# Patient Record
Sex: FEMALE | Race: WHITE | NOT HISPANIC OR LATINO | URBAN - METROPOLITAN AREA
[De-identification: names, ages, dates, MRNs, and addresses within clinical notes are randomized per-mention and may not be internally consistent; named-entity substitution may affect disease eponyms.]

---

## 2017-06-30 ENCOUNTER — EMERGENCY (EMERGENCY)
Facility: HOSPITAL | Age: 67
LOS: 1 days | Discharge: HOME | End: 2017-06-30
Attending: EMERGENCY MEDICINE
Payer: COMMERCIAL

## 2017-06-30 VITALS
HEIGHT: 68 IN | RESPIRATION RATE: 18 BRPM | SYSTOLIC BLOOD PRESSURE: 144 MMHG | OXYGEN SATURATION: 98 % | WEIGHT: 169.98 LBS | HEART RATE: 98 BPM | TEMPERATURE: 97 F | DIASTOLIC BLOOD PRESSURE: 76 MMHG

## 2017-06-30 DIAGNOSIS — R19.7 DIARRHEA, UNSPECIFIED: ICD-10-CM

## 2017-06-30 DIAGNOSIS — R52 PAIN, UNSPECIFIED: ICD-10-CM

## 2017-06-30 PROCEDURE — 99284 EMERGENCY DEPT VISIT MOD MDM: CPT

## 2017-07-01 LAB
ALBUMIN SERPL ELPH-MCNC: 3.9 G/DL — SIGNIFICANT CHANGE UP (ref 3.5–5)
ALP SERPL-CCNC: 106 U/L — SIGNIFICANT CHANGE UP (ref 40–120)
ALT FLD-CCNC: 70 U/L DA — HIGH (ref 10–60)
ANION GAP SERPL CALC-SCNC: 8 MMOL/L — SIGNIFICANT CHANGE UP (ref 5–17)
APTT BLD: 27.5 SEC — SIGNIFICANT CHANGE UP (ref 27.5–37.4)
AST SERPL-CCNC: 41 U/L — HIGH (ref 10–40)
BASOPHILS # BLD AUTO: 0.1 K/UL — SIGNIFICANT CHANGE UP (ref 0–0.2)
BASOPHILS NFR BLD AUTO: 1.6 % — SIGNIFICANT CHANGE UP (ref 0–2)
BILIRUB SERPL-MCNC: 0.8 MG/DL — SIGNIFICANT CHANGE UP (ref 0.2–1.2)
BUN SERPL-MCNC: 20 MG/DL — HIGH (ref 7–18)
CALCIUM SERPL-MCNC: 8.9 MG/DL — SIGNIFICANT CHANGE UP (ref 8.4–10.5)
CHLORIDE SERPL-SCNC: 107 MMOL/L — SIGNIFICANT CHANGE UP (ref 96–108)
CO2 SERPL-SCNC: 28 MMOL/L — SIGNIFICANT CHANGE UP (ref 22–31)
CREAT SERPL-MCNC: 0.82 MG/DL — SIGNIFICANT CHANGE UP (ref 0.5–1.3)
EOSINOPHIL # BLD AUTO: 0.1 K/UL — SIGNIFICANT CHANGE UP (ref 0–0.5)
EOSINOPHIL NFR BLD AUTO: 1.3 % — SIGNIFICANT CHANGE UP (ref 0–6)
GLUCOSE SERPL-MCNC: 130 MG/DL — HIGH (ref 70–99)
HCT VFR BLD CALC: 45.8 % — HIGH (ref 34.5–45)
HGB BLD-MCNC: 14.6 G/DL — SIGNIFICANT CHANGE UP (ref 11.5–15.5)
INR BLD: 0.93 RATIO — SIGNIFICANT CHANGE UP (ref 0.88–1.16)
LYMPHOCYTES # BLD AUTO: 2.3 K/UL — SIGNIFICANT CHANGE UP (ref 1–3.3)
LYMPHOCYTES # BLD AUTO: 42.6 % — SIGNIFICANT CHANGE UP (ref 13–44)
MCHC RBC-ENTMCNC: 29.6 PG — SIGNIFICANT CHANGE UP (ref 27–34)
MCHC RBC-ENTMCNC: 31.9 GM/DL — LOW (ref 32–36)
MCV RBC AUTO: 92.9 FL — SIGNIFICANT CHANGE UP (ref 80–100)
MONOCYTES # BLD AUTO: 0.5 K/UL — SIGNIFICANT CHANGE UP (ref 0–0.9)
MONOCYTES NFR BLD AUTO: 8.4 % — SIGNIFICANT CHANGE UP (ref 2–14)
NEUTROPHILS # BLD AUTO: 2.5 K/UL — SIGNIFICANT CHANGE UP (ref 1.8–7.4)
NEUTROPHILS NFR BLD AUTO: 46.2 % — SIGNIFICANT CHANGE UP (ref 43–77)
PCP SPEC-MCNC: SIGNIFICANT CHANGE UP
PLATELET # BLD AUTO: 267 K/UL — SIGNIFICANT CHANGE UP (ref 150–400)
POTASSIUM SERPL-MCNC: 4.2 MMOL/L — SIGNIFICANT CHANGE UP (ref 3.5–5.3)
POTASSIUM SERPL-SCNC: 4.2 MMOL/L — SIGNIFICANT CHANGE UP (ref 3.5–5.3)
PROT SERPL-MCNC: 7.8 G/DL — SIGNIFICANT CHANGE UP (ref 6–8.3)
PROTHROM AB SERPL-ACNC: 10.1 SEC — SIGNIFICANT CHANGE UP (ref 9.8–12.7)
RBC # BLD: 4.93 M/UL — SIGNIFICANT CHANGE UP (ref 3.8–5.2)
RBC # FLD: 11.8 % — SIGNIFICANT CHANGE UP (ref 10.3–14.5)
SODIUM SERPL-SCNC: 143 MMOL/L — SIGNIFICANT CHANGE UP (ref 135–145)
WBC # BLD: 5.4 K/UL — SIGNIFICANT CHANGE UP (ref 3.8–10.5)
WBC # FLD AUTO: 5.4 K/UL — SIGNIFICANT CHANGE UP (ref 3.8–10.5)

## 2017-07-01 PROCEDURE — 80053 COMPREHEN METABOLIC PANEL: CPT

## 2017-07-01 PROCEDURE — 36415 COLL VENOUS BLD VENIPUNCTURE: CPT

## 2017-07-01 PROCEDURE — 80307 DRUG TEST PRSMV CHEM ANLYZR: CPT

## 2017-07-01 PROCEDURE — 99284 EMERGENCY DEPT VISIT MOD MDM: CPT | Mod: 25

## 2017-07-01 PROCEDURE — 85027 COMPLETE CBC AUTOMATED: CPT

## 2017-07-01 PROCEDURE — 96374 THER/PROPH/DIAG INJ IV PUSH: CPT

## 2017-07-01 PROCEDURE — 93005 ELECTROCARDIOGRAM TRACING: CPT

## 2017-07-01 PROCEDURE — 85610 PROTHROMBIN TIME: CPT

## 2017-07-01 PROCEDURE — 85730 THROMBOPLASTIN TIME PARTIAL: CPT

## 2017-07-01 RX ORDER — CYCLOBENZAPRINE HYDROCHLORIDE 10 MG/1
10 TABLET, FILM COATED ORAL ONCE
Qty: 0 | Refills: 0 | Status: COMPLETED | OUTPATIENT
Start: 2017-07-01 | End: 2017-07-01

## 2017-07-01 RX ORDER — SODIUM CHLORIDE 9 MG/ML
1000 INJECTION INTRAMUSCULAR; INTRAVENOUS; SUBCUTANEOUS ONCE
Qty: 0 | Refills: 0 | Status: COMPLETED | OUTPATIENT
Start: 2017-07-01 | End: 2017-07-01

## 2017-07-01 RX ORDER — KETOROLAC TROMETHAMINE 30 MG/ML
30 SYRINGE (ML) INJECTION ONCE
Qty: 0 | Refills: 0 | Status: DISCONTINUED | OUTPATIENT
Start: 2017-07-01 | End: 2017-07-01

## 2017-07-01 RX ADMIN — Medication 30 MILLIGRAM(S): at 04:20

## 2017-07-01 RX ADMIN — SODIUM CHLORIDE 1000 MILLILITER(S): 9 INJECTION INTRAMUSCULAR; INTRAVENOUS; SUBCUTANEOUS at 02:20

## 2017-07-01 RX ADMIN — Medication 30 MILLIGRAM(S): at 02:32

## 2017-07-01 RX ADMIN — CYCLOBENZAPRINE HYDROCHLORIDE 10 MILLIGRAM(S): 10 TABLET, FILM COATED ORAL at 04:27

## 2017-07-01 NOTE — ED PROVIDER NOTE - OBJECTIVE STATEMENT
66 year old female came to the ED because she does not feel well. She has not traveled recently and denies any medical or psychiatric history. She feels that she ate a bad egg or bad orange juice and noted watery non-bloody diarrhea over the last 2 days. She also reports body aches. No fever, no vomiting, no abd pain, no chest pain, no sob.

## 2017-07-02 ENCOUNTER — EMERGENCY (EMERGENCY)
Facility: HOSPITAL | Age: 67
LOS: 1 days | Discharge: HOME | End: 2017-07-02
Attending: EMERGENCY MEDICINE
Payer: COMMERCIAL

## 2017-07-02 VITALS
OXYGEN SATURATION: 98 % | HEIGHT: 68 IN | TEMPERATURE: 98 F | DIASTOLIC BLOOD PRESSURE: 81 MMHG | HEART RATE: 91 BPM | RESPIRATION RATE: 18 BRPM | WEIGHT: 169.98 LBS | SYSTOLIC BLOOD PRESSURE: 111 MMHG

## 2017-07-02 VITALS
TEMPERATURE: 98 F | OXYGEN SATURATION: 100 % | RESPIRATION RATE: 16 BRPM | DIASTOLIC BLOOD PRESSURE: 75 MMHG | SYSTOLIC BLOOD PRESSURE: 144 MMHG

## 2017-07-02 VITALS
TEMPERATURE: 98 F | DIASTOLIC BLOOD PRESSURE: 78 MMHG | HEART RATE: 90 BPM | RESPIRATION RATE: 17 BRPM | SYSTOLIC BLOOD PRESSURE: 116 MMHG | OXYGEN SATURATION: 98 %

## 2017-07-02 VITALS
WEIGHT: 169.98 LBS | HEART RATE: 88 BPM | SYSTOLIC BLOOD PRESSURE: 151 MMHG | TEMPERATURE: 98 F | OXYGEN SATURATION: 100 % | RESPIRATION RATE: 16 BRPM | DIASTOLIC BLOOD PRESSURE: 76 MMHG | HEIGHT: 68 IN

## 2017-07-02 DIAGNOSIS — Z79.82 LONG TERM (CURRENT) USE OF ASPIRIN: ICD-10-CM

## 2017-07-02 DIAGNOSIS — K30 FUNCTIONAL DYSPEPSIA: ICD-10-CM

## 2017-07-02 DIAGNOSIS — R21 RASH AND OTHER NONSPECIFIC SKIN ERUPTION: ICD-10-CM

## 2017-07-02 DIAGNOSIS — R14.0 ABDOMINAL DISTENSION (GASEOUS): ICD-10-CM

## 2017-07-02 DIAGNOSIS — G43.909 MIGRAINE, UNSPECIFIED, NOT INTRACTABLE, WITHOUT STATUS MIGRAINOSUS: ICD-10-CM

## 2017-07-02 LAB
ANION GAP SERPL CALC-SCNC: 5 MMOL/L — SIGNIFICANT CHANGE UP (ref 5–17)
APTT BLD: 25.2 SEC — LOW (ref 27.5–37.4)
BASOPHILS # BLD AUTO: 0.1 K/UL — SIGNIFICANT CHANGE UP (ref 0–0.2)
BASOPHILS NFR BLD AUTO: 1.3 % — SIGNIFICANT CHANGE UP (ref 0–2)
BUN SERPL-MCNC: 22 MG/DL — HIGH (ref 7–18)
CALCIUM SERPL-MCNC: 9 MG/DL — SIGNIFICANT CHANGE UP (ref 8.4–10.5)
CHLORIDE SERPL-SCNC: 110 MMOL/L — HIGH (ref 96–108)
CO2 SERPL-SCNC: 29 MMOL/L — SIGNIFICANT CHANGE UP (ref 22–31)
CREAT SERPL-MCNC: 0.67 MG/DL — SIGNIFICANT CHANGE UP (ref 0.5–1.3)
EOSINOPHIL # BLD AUTO: 0.1 K/UL — SIGNIFICANT CHANGE UP (ref 0–0.5)
EOSINOPHIL NFR BLD AUTO: 1.8 % — SIGNIFICANT CHANGE UP (ref 0–6)
GLUCOSE SERPL-MCNC: 105 MG/DL — HIGH (ref 70–99)
HAV IGM SER-ACNC: SIGNIFICANT CHANGE UP
HBV CORE IGM SER-ACNC: SIGNIFICANT CHANGE UP
HBV SURFACE AG SER-ACNC: SIGNIFICANT CHANGE UP
HCT VFR BLD CALC: 41.4 % — SIGNIFICANT CHANGE UP (ref 34.5–45)
HCV AB S/CO SERPL IA: 0.12 S/CO — SIGNIFICANT CHANGE UP
HCV AB SERPL-IMP: SIGNIFICANT CHANGE UP
HGB BLD-MCNC: 13.6 G/DL — SIGNIFICANT CHANGE UP (ref 11.5–15.5)
INR BLD: 0.84 RATIO — LOW (ref 0.88–1.16)
LYMPHOCYTES # BLD AUTO: 2.4 K/UL — SIGNIFICANT CHANGE UP (ref 1–3.3)
LYMPHOCYTES # BLD AUTO: 34.8 % — SIGNIFICANT CHANGE UP (ref 13–44)
MCHC RBC-ENTMCNC: 30.9 PG — SIGNIFICANT CHANGE UP (ref 27–34)
MCHC RBC-ENTMCNC: 32.8 GM/DL — SIGNIFICANT CHANGE UP (ref 32–36)
MCV RBC AUTO: 94.2 FL — SIGNIFICANT CHANGE UP (ref 80–100)
MONOCYTES # BLD AUTO: 0.7 K/UL — SIGNIFICANT CHANGE UP (ref 0–0.9)
MONOCYTES NFR BLD AUTO: 10.2 % — SIGNIFICANT CHANGE UP (ref 2–14)
NEUTROPHILS # BLD AUTO: 3.5 K/UL — SIGNIFICANT CHANGE UP (ref 1.8–7.4)
NEUTROPHILS NFR BLD AUTO: 51.8 % — SIGNIFICANT CHANGE UP (ref 43–77)
PLATELET # BLD AUTO: 250 K/UL — SIGNIFICANT CHANGE UP (ref 150–400)
POTASSIUM SERPL-MCNC: 4.8 MMOL/L — SIGNIFICANT CHANGE UP (ref 3.5–5.3)
POTASSIUM SERPL-SCNC: 4.8 MMOL/L — SIGNIFICANT CHANGE UP (ref 3.5–5.3)
PROTHROM AB SERPL-ACNC: 9.1 SEC — LOW (ref 9.8–12.7)
RBC # BLD: 4.39 M/UL — SIGNIFICANT CHANGE UP (ref 3.8–5.2)
RBC # FLD: 12.2 % — SIGNIFICANT CHANGE UP (ref 10.3–14.5)
SODIUM SERPL-SCNC: 144 MMOL/L — SIGNIFICANT CHANGE UP (ref 135–145)
WBC # BLD: 6.8 K/UL — SIGNIFICANT CHANGE UP (ref 3.8–10.5)
WBC # FLD AUTO: 6.8 K/UL — SIGNIFICANT CHANGE UP (ref 3.8–10.5)

## 2017-07-02 PROCEDURE — 85027 COMPLETE CBC AUTOMATED: CPT

## 2017-07-02 PROCEDURE — 99284 EMERGENCY DEPT VISIT MOD MDM: CPT | Mod: 25

## 2017-07-02 PROCEDURE — 36415 COLL VENOUS BLD VENIPUNCTURE: CPT

## 2017-07-02 PROCEDURE — 96375 TX/PRO/DX INJ NEW DRUG ADDON: CPT

## 2017-07-02 PROCEDURE — 85730 THROMBOPLASTIN TIME PARTIAL: CPT

## 2017-07-02 PROCEDURE — 80074 ACUTE HEPATITIS PANEL: CPT

## 2017-07-02 PROCEDURE — 96372 THER/PROPH/DIAG INJ SC/IM: CPT

## 2017-07-02 PROCEDURE — 85610 PROTHROMBIN TIME: CPT

## 2017-07-02 PROCEDURE — 96374 THER/PROPH/DIAG INJ IV PUSH: CPT

## 2017-07-02 PROCEDURE — 80048 BASIC METABOLIC PNL TOTAL CA: CPT

## 2017-07-02 RX ORDER — AZTREONAM 2 G
1 VIAL (EA) INJECTION
Qty: 20 | Refills: 0 | OUTPATIENT
Start: 2017-07-02 | End: 2017-07-12

## 2017-07-02 RX ORDER — ASPIRIN/CALCIUM CARB/MAGNESIUM 324 MG
0 TABLET ORAL
Qty: 0 | Refills: 0 | COMMUNITY

## 2017-07-02 RX ORDER — KETOROLAC TROMETHAMINE 30 MG/ML
15 SYRINGE (ML) INJECTION ONCE
Qty: 0 | Refills: 0 | Status: DISCONTINUED | OUTPATIENT
Start: 2017-07-02 | End: 2017-07-02

## 2017-07-02 RX ORDER — ONDANSETRON 8 MG/1
4 TABLET, FILM COATED ORAL ONCE
Qty: 0 | Refills: 0 | Status: COMPLETED | OUTPATIENT
Start: 2017-07-02 | End: 2017-07-02

## 2017-07-02 RX ORDER — ONDANSETRON 8 MG/1
8 TABLET, FILM COATED ORAL ONCE
Qty: 0 | Refills: 0 | Status: COMPLETED | OUTPATIENT
Start: 2017-07-02 | End: 2017-07-02

## 2017-07-02 RX ORDER — KETOROLAC TROMETHAMINE 30 MG/ML
30 SYRINGE (ML) INJECTION ONCE
Qty: 0 | Refills: 0 | Status: DISCONTINUED | OUTPATIENT
Start: 2017-07-02 | End: 2017-07-02

## 2017-07-02 RX ORDER — CYCLOBENZAPRINE HYDROCHLORIDE 10 MG/1
1 TABLET, FILM COATED ORAL
Qty: 21 | Refills: 0 | OUTPATIENT
Start: 2017-07-02 | End: 2017-07-09

## 2017-07-02 RX ORDER — CYCLOBENZAPRINE HYDROCHLORIDE 10 MG/1
10 TABLET, FILM COATED ORAL ONCE
Qty: 0 | Refills: 0 | Status: COMPLETED | OUTPATIENT
Start: 2017-07-02 | End: 2017-07-02

## 2017-07-02 RX ORDER — LOPERAMIDE HCL 2 MG
2 TABLET ORAL ONCE
Qty: 0 | Refills: 0 | Status: COMPLETED | OUTPATIENT
Start: 2017-07-02 | End: 2017-07-02

## 2017-07-02 RX ORDER — SODIUM CHLORIDE 9 MG/ML
500 INJECTION INTRAMUSCULAR; INTRAVENOUS; SUBCUTANEOUS ONCE
Qty: 0 | Refills: 0 | Status: COMPLETED | OUTPATIENT
Start: 2017-07-02 | End: 2017-07-02

## 2017-07-02 RX ORDER — SIMETHICONE 80 MG/1
80 TABLET, CHEWABLE ORAL ONCE
Qty: 0 | Refills: 0 | Status: COMPLETED | OUTPATIENT
Start: 2017-07-02 | End: 2017-07-02

## 2017-07-02 RX ADMIN — ONDANSETRON 8 MILLIGRAM(S): 8 TABLET, FILM COATED ORAL at 21:40

## 2017-07-02 RX ADMIN — ONDANSETRON 4 MILLIGRAM(S): 8 TABLET, FILM COATED ORAL at 02:44

## 2017-07-02 RX ADMIN — Medication 15 MILLIGRAM(S): at 21:40

## 2017-07-02 RX ADMIN — Medication 30 MILLIGRAM(S): at 02:30

## 2017-07-02 RX ADMIN — Medication 1 TABLET(S): at 05:52

## 2017-07-02 RX ADMIN — SIMETHICONE 80 MILLIGRAM(S): 80 TABLET, CHEWABLE ORAL at 21:45

## 2017-07-02 RX ADMIN — Medication 30 MILLIGRAM(S): at 02:45

## 2017-07-02 RX ADMIN — SODIUM CHLORIDE 500 MILLILITER(S): 9 INJECTION INTRAMUSCULAR; INTRAVENOUS; SUBCUTANEOUS at 02:45

## 2017-07-02 RX ADMIN — CYCLOBENZAPRINE HYDROCHLORIDE 10 MILLIGRAM(S): 10 TABLET, FILM COATED ORAL at 05:57

## 2017-07-02 RX ADMIN — Medication 2 MILLIGRAM(S): at 02:44

## 2017-07-02 NOTE — ED ADULT NURSE NOTE - CHPI ED SYMPTOMS NEG
no decreased eating/drinking/no fever/no dizziness/no tingling/no nausea/no weakness/no numbness/no chills

## 2017-07-02 NOTE — ED PROVIDER NOTE - CHPI ED SYMPTOMS NEG
no  fever, no chills, no nausea, no vomiting, no diarrhea, no abd pain, no numbness, no tingling, no weakness

## 2017-07-02 NOTE — ED PROVIDER NOTE - MEDICAL DECISION MAKING DETAILS
Will have patient elevate legs, take abx and rest.  Follow up closely with pmd. Precautions reviewed.

## 2017-07-02 NOTE — ED ADULT NURSE NOTE - OBJECTIVE STATEMENT
Pt states that she brought a watermelon on thurs and she ate it on Thurs since then she is having bloated stomach. was here 3 days in a row and it is getting worst. Pt co bug bite on bilateral legs. Co nausea, no vomiting or diarrhea.

## 2017-07-02 NOTE — ED PROVIDER NOTE - OBJECTIVE STATEMENT
67 y/o F pt with no significant PMHx presents to ED c/o abd distention and rash to b/l lower extremities x today. Pt denies fever, chills, nausea, vomiting, diarrhea, abd pain, numbness, tingling, weakness, or any other complaints. NKDA.

## 2017-07-02 NOTE — ED PROVIDER NOTE - OBJECTIVE STATEMENT
65 y/o F with no significant PMHx presents to ED c/o abd distension today. Pt was seen in ED yesterday for abd distension and rash to b/l LE yesterday, which she had had for 2 weeks. Pt was asymptomatic at time of discharge and returns today with similar presentation. Pt denies any chest pain, fever, chills, nausea, vomiting, diarrhea, abd pain, numbness, tingling, weakness, or any other complaints. NKDA.

## 2017-07-03 ENCOUNTER — EMERGENCY (EMERGENCY)
Facility: HOSPITAL | Age: 67
LOS: 1 days | Discharge: HOME | End: 2017-07-03
Attending: EMERGENCY MEDICINE
Payer: COMMERCIAL

## 2017-07-03 VITALS
HEART RATE: 90 BPM | OXYGEN SATURATION: 98 % | SYSTOLIC BLOOD PRESSURE: 143 MMHG | WEIGHT: 169.98 LBS | HEIGHT: 68 IN | RESPIRATION RATE: 20 BRPM | DIASTOLIC BLOOD PRESSURE: 84 MMHG | TEMPERATURE: 99 F

## 2017-07-03 VITALS
DIASTOLIC BLOOD PRESSURE: 75 MMHG | OXYGEN SATURATION: 100 % | RESPIRATION RATE: 20 BRPM | SYSTOLIC BLOOD PRESSURE: 154 MMHG | HEART RATE: 87 BPM | TEMPERATURE: 98 F
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
## 2017-07-03 DIAGNOSIS — Z79.82 LONG TERM (CURRENT) USE OF ASPIRIN: ICD-10-CM

## 2017-07-03 DIAGNOSIS — F30.9 MANIC EPISODE, UNSPECIFIED: ICD-10-CM
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
## 2017-07-03 DIAGNOSIS — S80.212A ABRASION, LEFT KNEE, INITIAL ENCOUNTER: ICD-10-CM

## 2017-07-03 DIAGNOSIS — S60.512A ABRASION OF LEFT HAND, INITIAL ENCOUNTER: ICD-10-CM

## 2017-07-03 LAB
ANION GAP SERPL CALC-SCNC: 5 MMOL/L — SIGNIFICANT CHANGE UP (ref 5–17)
APPEARANCE UR: CLEAR — SIGNIFICANT CHANGE UP
BASOPHILS # BLD AUTO: 0.1 K/UL — SIGNIFICANT CHANGE UP (ref 0–0.2)
BASOPHILS NFR BLD AUTO: 0.9 % — SIGNIFICANT CHANGE UP (ref 0–2)
BILIRUB UR-MCNC: NEGATIVE — SIGNIFICANT CHANGE UP
BUN SERPL-MCNC: 21 MG/DL — HIGH (ref 7–18)
CALCIUM SERPL-MCNC: 8.5 MG/DL — SIGNIFICANT CHANGE UP (ref 8.4–10.5)
CHLORIDE SERPL-SCNC: 107 MMOL/L — SIGNIFICANT CHANGE UP (ref 96–108)
CO2 SERPL-SCNC: 28 MMOL/L — SIGNIFICANT CHANGE UP (ref 22–31)
COLOR SPEC: YELLOW — SIGNIFICANT CHANGE UP
CREAT SERPL-MCNC: 0.86 MG/DL — SIGNIFICANT CHANGE UP (ref 0.5–1.3)
DIFF PNL FLD: NEGATIVE — SIGNIFICANT CHANGE UP
EOSINOPHIL # BLD AUTO: 0.1 K/UL — SIGNIFICANT CHANGE UP (ref 0–0.5)
EOSINOPHIL NFR BLD AUTO: 1.8 % — SIGNIFICANT CHANGE UP (ref 0–6)
ETHANOL SERPL-MCNC: 4 MG/DL — SIGNIFICANT CHANGE UP (ref 0–10)
GLUCOSE SERPL-MCNC: 116 MG/DL — HIGH (ref 70–99)
GLUCOSE UR QL: NEGATIVE — SIGNIFICANT CHANGE UP
HCT VFR BLD CALC: 38.3 % — SIGNIFICANT CHANGE UP (ref 34.5–45)
HGB BLD-MCNC: 12.8 G/DL — SIGNIFICANT CHANGE UP (ref 11.5–15.5)
KETONES UR-MCNC: NEGATIVE — SIGNIFICANT CHANGE UP
LEUKOCYTE ESTERASE UR-ACNC: NEGATIVE — SIGNIFICANT CHANGE UP
LYMPHOCYTES # BLD AUTO: 1.9 K/UL — SIGNIFICANT CHANGE UP (ref 1–3.3)
LYMPHOCYTES # BLD AUTO: 29.6 % — SIGNIFICANT CHANGE UP (ref 13–44)
MCHC RBC-ENTMCNC: 30.9 PG — SIGNIFICANT CHANGE UP (ref 27–34)
MCHC RBC-ENTMCNC: 33.3 GM/DL — SIGNIFICANT CHANGE UP (ref 32–36)
MCV RBC AUTO: 92.8 FL — SIGNIFICANT CHANGE UP (ref 80–100)
MONOCYTES # BLD AUTO: 0.7 K/UL — SIGNIFICANT CHANGE UP (ref 0–0.9)
MONOCYTES NFR BLD AUTO: 11.2 % — SIGNIFICANT CHANGE UP (ref 2–14)
NEUTROPHILS # BLD AUTO: 3.6 K/UL — SIGNIFICANT CHANGE UP (ref 1.8–7.4)
NEUTROPHILS NFR BLD AUTO: 56.4 % — SIGNIFICANT CHANGE UP (ref 43–77)
NITRITE UR-MCNC: NEGATIVE — SIGNIFICANT CHANGE UP
PCP SPEC-MCNC: SIGNIFICANT CHANGE UP
PH UR: 6.5 — SIGNIFICANT CHANGE UP (ref 5–8)
PLATELET # BLD AUTO: 251 K/UL — SIGNIFICANT CHANGE UP (ref 150–400)
POTASSIUM SERPL-MCNC: 4.3 MMOL/L — SIGNIFICANT CHANGE UP (ref 3.5–5.3)
POTASSIUM SERPL-SCNC: 4.3 MMOL/L — SIGNIFICANT CHANGE UP (ref 3.5–5.3)
PROT UR-MCNC: NEGATIVE — SIGNIFICANT CHANGE UP
RBC # BLD: 4.13 M/UL — SIGNIFICANT CHANGE UP (ref 3.8–5.2)
RBC # FLD: 12.3 % — SIGNIFICANT CHANGE UP (ref 10.3–14.5)
SODIUM SERPL-SCNC: 140 MMOL/L — SIGNIFICANT CHANGE UP (ref 135–145)
SP GR SPEC: 1.01 — SIGNIFICANT CHANGE UP (ref 1.01–1.02)
T4 AB SER-ACNC: 8.6 UG/DL — SIGNIFICANT CHANGE UP (ref 4.6–12)
TSH SERPL-MCNC: 2.79 UU/ML — SIGNIFICANT CHANGE UP (ref 0.34–4.82)
UROBILINOGEN FLD QL: NEGATIVE — SIGNIFICANT CHANGE UP
WBC # BLD: 6.4 K/UL — SIGNIFICANT CHANGE UP (ref 3.8–10.5)
WBC # FLD AUTO: 6.4 K/UL — SIGNIFICANT CHANGE UP (ref 3.8–10.5)

## 2017-07-03 PROCEDURE — 81003 URINALYSIS AUTO W/O SCOPE: CPT

## 2017-07-03 PROCEDURE — 84443 ASSAY THYROID STIM HORMONE: CPT

## 2017-07-03 PROCEDURE — 80048 BASIC METABOLIC PNL TOTAL CA: CPT

## 2017-07-03 PROCEDURE — 99285 EMERGENCY DEPT VISIT HI MDM: CPT

## 2017-07-03 PROCEDURE — 85027 COMPLETE CBC AUTOMATED: CPT

## 2017-07-03 PROCEDURE — 36415 COLL VENOUS BLD VENIPUNCTURE: CPT

## 2017-07-03 PROCEDURE — 99284 EMERGENCY DEPT VISIT MOD MDM: CPT | Mod: 25

## 2017-07-03 PROCEDURE — 90792 PSYCH DIAG EVAL W/MED SRVCS: CPT

## 2017-07-03 PROCEDURE — 84436 ASSAY OF TOTAL THYROXINE: CPT

## 2017-07-03 PROCEDURE — 80307 DRUG TEST PRSMV CHEM ANLYZR: CPT

## 2017-07-03 RX ORDER — SODIUM CHLORIDE 9 MG/ML
1000 INJECTION INTRAMUSCULAR; INTRAVENOUS; SUBCUTANEOUS
Qty: 0 | Refills: 0 | Status: DISCONTINUED | OUTPATIENT
Start: 2017-07-03 | End: 2017-07-07

## 2017-07-03 RX ADMIN — SODIUM CHLORIDE 125 MILLILITER(S): 9 INJECTION INTRAMUSCULAR; INTRAVENOUS; SUBCUTANEOUS at 05:35

## 2017-07-03 NOTE — ED PROVIDER NOTE - PROGRESS NOTE DETAILS
This is the patients fourth visit to the ED in the last 3 days. The first two visits I cared for her, the third was by another physician. During previous visits she never admitted to a psychiatric history, but now admits to a history. She has not slept in 3 days. Denies suicidal or homicidal ideation. Psych consulted. Telepsych evaluated the patient, but they are arranging outpatient follow up, but will be discharged.

## 2017-07-03 NOTE — ED BEHAVIORAL HEALTH ASSESSMENT NOTE - DETAILS
aunt had depression insect bites on her legs discussed hold for the AM for collateral and arrange outpatient services if discharged. Dr. Frazier to discuss with oncoming tele BTA. came by self

## 2017-07-03 NOTE — ED BEHAVIORAL HEALTH ASSESSMENT NOTE - HPI (INCLUDE ILLNESS QUALITY, SEVERITY, DURATION, TIMING, CONTEXT, MODIFYING FACTORS, ASSOCIATED SIGNS AND SYMPTOMS)
67yo DWF, retired, living alone at the [facility] in [place], no reported medical hx, with a psychiatric hx significant for 5 prior psychiatric hospitalizations (last in 2011) for likely bipolar illness, however states that she has never been compliant with medications. Pt has been coming to the ER 4 times in the past 2 days with reports of vague pain, given injections of Toradol and released. ER felt that the pt was hyperverbal, odd, and preoccupied with thoughts of the board of the building being against her, however psychiatry had not been consulted because pt was not a danger to herself. Psychiatry was consulted today because the pt returned to the ER twice in one day and had some bizarre behavior (brought a bag with makeup samples, CD's and papers).    The pt presents hyperactive with some pressured speech. She is preoccupied with thoughts of board of her building being involved in some illegal activity. Pt also has some paranoid delusions that they are putting bugs in her bed and poisoning her food, and thus needs to come to the ER to have her bug bites examined (does have scabs on her legs) and get an IV to flush out the toxins. The pt states that they have no reason not to like her, she has no thoughts of retaliation, and states that she just plans on "keeping my mouth shut." Pt denies any AVH and does not appear internally preoccupied. Pt states that mood is "joyful" and states that she only requires 3 hours of sleep.   Denies any depressed mood, SI, SIB, SA. Pt denies h/o violence or any HI. She denies any h/o substance use.  pt states that she has not been f/u with psychiatry because her psychiatrist is an alcoholic, however she states that she would be interested in  services for case management and therapy.

## 2017-07-03 NOTE — ED BEHAVIORAL HEALTH ASSESSMENT NOTE - PSYCHIATRIC ISSUES AND PLAN (INCLUDE STANDING AND PRN MEDICATION)
pt currently not agitated. If pt becomes acutely agitated and physically aggressive can give Haldol 2mg IM

## 2017-07-03 NOTE — ED BEHAVIORAL HEALTH ASSESSMENT NOTE - DESCRIPTION
the pt has been in the ER multiple times. she remains hyperactive, but has been cooperative and non-aggressive. denied pt is , does not have many friends or family. she lives alone at the [facility]. 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

## 2017-07-03 NOTE — ED ADULT NURSE REASSESSMENT NOTE - NS ED NURSE REASSESS COMMENT FT1
Report received from HEYDI Gallo RN at 0700. Patient sitting on chair. Awake. Breathing on room air. Denies discomfort. Awaiting social work. 0800 patient states "I want to go to the cafeteria." MD Frazier made aware. IVHL removed.

## 2017-07-03 NOTE — ED BEHAVIORAL HEALTH NOTE - BEHAVIORAL HEALTH NOTE
[facility] attempted to contact patient’s emergency contact Arron Russo ([phone]) – No returned call or response. Also attempted to contact patient’s housing complex [phone] from 9am-9:45am- No answer.  Patient provided Professor Matthew Navarro [phone]  for collateral- Professor unavailable today. Unable to obtain any additional information.   Case was discussed with [place] Social Work team- Pt reporting that she has bed bugs in her apartment, discussed possible APS referral for home visit.  Disposition: Treat & Release   I have discussed the follow with Behavioral Telehealth Attending Dr. Perea

## 2017-07-03 NOTE — ED BEHAVIORAL HEALTH ASSESSMENT NOTE - SUMMARY
65yo DWF, retired, living alone at the [facility], no reported medical hx, with a psychiatric hx significant for 5 prior psychiatric hospitalizations (last in 2011) for likely bipolar illness, with poor treatment compliance. Pt has been presenting to the medical ER several times this week c/o vague pain and insect bites. psychiatry was consulted for bizarre behavior. The pt is presenting with decreased need for sleep, mood lability, pressured speech and has some paranoid delusions that the people in her housing board are trying to poison her. However the pt does not appear to be an acute danger to self (no SI, h/o SA, and appears to be caring for herself) or a danger to others (denies a h/o violence and denies thoughts of retaliation and access to a gun). At this time, collateral would be needed to determine if this is pt's baseline vs. escalation of symptoms. Collateral would be ideally from the building management to have a better understand of the pt's functioning and whether she has been threatening in any way. 65yo DWF, retired, living alone at the [facility], no reported medical hx, with a psychiatric hx significant for 5 prior psychiatric hospitalizations (last in 2011) for likely bipolar illness, with poor treatment compliance. Pt has been presenting to the medical ER several times this week c/o vague pain and insect bites. psychiatry was consulted for bizarre behavior. The pt is presenting with decreased need for sleep, mood lability, pressured speech and has some paranoid delusions that the people in her housing board are trying to poison her. However the pt does not appear to be an acute danger to self (no SI, h/o SA, and appears to be caring for herself) or a danger to others (denies a h/o violence and denies thoughts of retaliation and access to a gun). At this time, collateral would be helpful to determine if this is pt's baseline vs. escalation of symptoms. Collateral would be ideally from the building management to have a better understand of the pt's functioning and whether she has been threatening in any way. Pt also should have social work involvement to help obtain referrals in the community and reduce ER visits.

## 2017-07-03 NOTE — ED PROVIDER NOTE - MEDICAL DECISION MAKING DETAILS
Pt evaluated by psychiatry. Pt evaluated by psychiatry.  signed out to me pending psych clearance and  consult. performed. pt ready for dc home.

## 2017-07-03 NOTE — ED ADULT NURSE REASSESSMENT NOTE - NS ED NURSE REASSESS COMMENT FT1
Patient seen by tele psych with PCA present at 1005. Per Keira, "patient has to be seen by social work and then discharged."

## 2017-07-03 NOTE — ED BEHAVIORAL HEALTH NOTE - BEHAVIORAL HEALTH NOTE
TELEPSYCHIATRY ENCOUNTER  **********************************************	  I have visualized that the patient is on an arms-length 1:1  I have visualized that patient is in a private space  I have confirmed with the patient that they understand and agree to the evaluation being performed via Telepsychiatry  I have discussed the above with Telepsychiatry Attending Dr. Nilda Erwin    Patient is a 66 year old domiciled alone in an apartment retired   female non-caregiver with a history of 5 prior psychiatric hospitalizations most recent 2011 most likely Bipolar, no known suicidality, no known violence/arrests, no known substance abuse/withdrawal, no known PMH  brought in by self complaining of falling, presented to ED for 4th time in the past 2 days, patient presents to ED hyperverbal and odd behavior.    CM attempted outreach to emergency contact Arron Russo ([phone]) left voicemail requesting return call, outreach made to patient’s housing complex [phone], spoke to ewelina who states patient is a resident but unable to provide information, states to call back after 9am for management. Patient unable to provide any additional collateral contacts. Records in [place], [facility], [facility], [facility], [facility], [facility], [facility] reviewed as appropriate, no additional information available. Per staff patient calm and cooperative in ED no PRN medications needed.

## 2017-07-03 NOTE — ED PROVIDER NOTE - OBJECTIVE STATEMENT
66 y/o 67 y/o F with no significant PMHx presents to ED c/o fall, now with abrasion to L hand and L knee. Pt was seen in ED twice previously in the past 24 hours, for a worsening abd bloating. Pt also c/o rash on b/l legs, from 'bed bugs that her apartment's board place in her apartment because she was bugging them because she found out that they were dirty and laundering money.' Pt does not want to speak to a therapist because 'her last therapist was jealous of her because she did not have a liquor problem, and he did.' Pt admits having been to rehab 9 times. Pt also expresses that she has not been able to sleep well for 3 days. Denies any nausea, vomiting, diarrhea or any other complaints. NKDA. 67 y/o F with no significant PMHx presents to ED c/o fall, now with abrasion to L hand and L knee. Pt was seen in ED twice previously in the past 24 hours. Pt also c/o rash on b/l legs, from 'bed bugs that her apartment's board place in her apartment because she was bugging them because she found out that they were dirty and laundering money.' Pt does not want to speak to a therapist because 'her last therapist was jealous of her because she did not have a liquor problem, and he did.' Pt admits having been admitted to a psychiatric lujan 9 times. Pt also expresses that she has not been able to sleep well for 3 days. Denies any nausea, vomiting, diarrhea or any other complaints. NKDA.

## 2018-07-19 NOTE — ED PROVIDER NOTE - CARE PLAN
Your surgery is scheduled for 7/24/18.    Please report to Outpatient Surgery Intake Office on the 2nd FLOOR at 7:00 a.m.          INSTRUCTIONS IMPORTANT!!!  ¨ Do not eat or drink after 12 midnight-including water. OK to brush teeth, no   gum, candy or mints!    ¨ Take only these medicines with a small swallow of water-morning of surgery: Enalapril         ____  No powder, lotions or creams to surgical area.  ____  Please remove all jewelry, including piercings and leave at home.  ____  No money or valuables needed. Please leave at home.  ____  Please bring any documents given by your doctor.  ____  If going home the same day, arrange for a ride home. You will not be able to             drive if Anesthesia was used.  ____  Wear loose fitting clothing. Allow for dressings, bandages.  ____  Stop Aspirin, Ibuprofen, Motrin and Aleve at least 3-5 days before surgery, unless otherwise instructed by your doctor, or the nurse.   You MAY use Tylenol/acetaminophen until day of surgery.  ____  If you take diabetic medication, do not take am of surgery unless instructed by Doctor.  ____  Call MD for temperature above 101 degrees.  ____ Stop taking any Fish Oil supplement or any Vitamins that contain Vitamin E at least 5 days prior to surgery.  ____ Do Not wear your contact lenses the day of your procedure.  You may wear your glasses.        I have read or had read and explained to me, and understand the above information.  Additional comments or instructions:  For additional questions call [phone]        Cystoscopy    Cystoscopy is a procedure that lets your doctor look directly inside your urethra and bladder. It can be used to:  · Help diagnose a problem with your urethra, bladder, or kidneys.  · Take a sample (biopsy) of bladder or urethral tissue.  · Treat certain problems (such as removing kidney stones).  · Place a stent to bypass an obstruction.  · Take special X-rays of the kidneys.  Based on the findings, your doctor  may recommend other tests or treatments.  What is a cystoscope?  A cystoscope is a telescope-like instrument that contains lenses and fiberoptics (small glass wires that make bright light). The cystoscope may be straight and rigid, or flexible to bend around curves in the urethra. The doctor may look directly into the cystoscope, or project the image onto a monitor.  Getting ready  · Ask your doctor if you should stop taking any medicines before the procedure.  · Ask whether you should avoid eating or drinking anything after midnight before the procedure.  · Follow any other instructions your doctor gives you.  Tell your doctor before the exam if you:  · Take any medicines, such as aspirin or blood thinners  · Have allergies to any medicines  · Are pregnant   The procedure  Cystoscopy is done in the doctors office, surgery center, or hospital. The doctor and a nurse are present during the procedure. It takes only a few minutes, longer if a biopsy, X-ray, or treatment needs to be done.  During the procedure:  · You lie on an exam table on your back, knees bent and legs apart. You are covered with a drape.  · Your urethra and the area around it are washed. Anesthetic jelly may be applied to numb the urethra. Other pain medicine is usually not needed. In some cases, you may be offered a mild sedative to help you relax. If a more extensive procedure is to be done, such as a biopsy or kidney stone removal, general anesthesia may be needed.  · The cystoscope is inserted. A sterile fluid is put into the bladder to expand it. You may feel pressure from this fluid.  · When the procedure is done, the cystoscope is removed.  After the procedure  If you had a sedative, general anesthesia, or spinal anesthesia, you must have someone drive you home. Once youre home:  · Drink plenty of fluids.  · You may have burning or light bleeding when you urinate--this is normal.  · Medicines may be prescribed to ease any discomfort or  Principal Discharge DX:	Diarrhea prevent infection. Take these as directed.  · Call your doctor if you have heavy bleeding or blood clots, burning that lasts more than a day, a fever over 100°F  (38° C), or trouble urinating.  Date Last Reviewed: 1/1/2017  © 3921-9041 [employer]. [address] [place] [address], [place] [address]. All rights reserved. This information is not intended as a substitute for professional medical care. Always follow your healthcare professional's instructions.

## 2019-10-27 NOTE — ED BEHAVIORAL HEALTH ASSESSMENT NOTE - OTHER PAST PSYCHIATRIC HISTORY (INCLUDE DETAILS REGARDING ONSET, COURSE OF ILLNESS, INPATIENT/OUTPATIENT TREATMENT)
knowledge deficit
pt has been psychiatrically hospitalized 5 times; last was in 2011 at [facility]. Pt did not provide name of previous provider, but states that she has been prescribed medication multiple times (named seroquel) in the past but has always refused. she denies any h/o SI or violence

## 2021-05-06 NOTE — ED BEHAVIORAL HEALTH ASSESSMENT NOTE - BODY HABITUS
Render Risk Assessment In Note?: no Note Text (......Xxx Chief Complaint.): This diagnosis correlates with the Detail Level: Zone Other (Free Text): Patient will schedule an excision appointment 5/12/2021. Well nourished